# Patient Record
Sex: FEMALE | Race: WHITE | NOT HISPANIC OR LATINO | Employment: UNEMPLOYED | URBAN - METROPOLITAN AREA
[De-identification: names, ages, dates, MRNs, and addresses within clinical notes are randomized per-mention and may not be internally consistent; named-entity substitution may affect disease eponyms.]

---

## 2021-12-01 ENCOUNTER — HOSPITAL ENCOUNTER (OUTPATIENT)
Facility: CLINIC | Age: 51
Setting detail: OBSERVATION
Discharge: HOME | End: 2021-12-02
Attending: EMERGENCY MEDICINE | Admitting: EMERGENCY MEDICINE
Payer: COMMERCIAL

## 2021-12-01 DIAGNOSIS — F41.1 GAD (GENERALIZED ANXIETY DISORDER): ICD-10-CM

## 2021-12-01 DIAGNOSIS — F32.A DEPRESSION, UNSPECIFIED DEPRESSION TYPE: ICD-10-CM

## 2021-12-01 DIAGNOSIS — F41.0 PANIC DISORDER WITHOUT AGORAPHOBIA: ICD-10-CM

## 2021-12-01 DIAGNOSIS — F13.20 SEDATIVE, HYPNOTIC OR ANXIOLYTIC DEPENDENCE (H): ICD-10-CM

## 2021-12-01 DIAGNOSIS — I10 BENIGN ESSENTIAL HYPERTENSION: ICD-10-CM

## 2021-12-01 LAB
SARS-COV-2 RNA RESP QL NAA+PROBE: NEGATIVE
TROPONIN I SERPL HS-MCNC: 5 NG/L

## 2021-12-01 PROCEDURE — 250N000013 HC RX MED GY IP 250 OP 250 PS 637: Performed by: NURSE PRACTITIONER

## 2021-12-01 PROCEDURE — G0378 HOSPITAL OBSERVATION PER HR: HCPCS

## 2021-12-01 PROCEDURE — 93005 ELECTROCARDIOGRAM TRACING: CPT

## 2021-12-01 PROCEDURE — 90791 PSYCH DIAGNOSTIC EVALUATION: CPT

## 2021-12-01 PROCEDURE — 99285 EMERGENCY DEPT VISIT HI MDM: CPT | Mod: 25

## 2021-12-01 PROCEDURE — 84484 ASSAY OF TROPONIN QUANT: CPT | Performed by: EMERGENCY MEDICINE

## 2021-12-01 PROCEDURE — 87635 SARS-COV-2 COVID-19 AMP PRB: CPT | Performed by: EMERGENCY MEDICINE

## 2021-12-01 PROCEDURE — C9803 HOPD COVID-19 SPEC COLLECT: HCPCS

## 2021-12-01 PROCEDURE — 250N000013 HC RX MED GY IP 250 OP 250 PS 637: Performed by: EMERGENCY MEDICINE

## 2021-12-01 PROCEDURE — 36415 COLL VENOUS BLD VENIPUNCTURE: CPT | Performed by: EMERGENCY MEDICINE

## 2021-12-01 PROCEDURE — 99220 PR INITIAL OBSERVATION CARE,LEVEL III: CPT | Performed by: NURSE PRACTITIONER

## 2021-12-01 RX ORDER — CLONIDINE HYDROCHLORIDE 0.2 MG/1
0.2 TABLET ORAL 2 TIMES DAILY
COMMUNITY
End: 2021-12-02

## 2021-12-01 RX ORDER — NICOTINE 21 MG/24HR
1 PATCH, TRANSDERMAL 24 HOURS TRANSDERMAL EVERY EVENING
Status: DISCONTINUED | OUTPATIENT
Start: 2021-12-01 | End: 2021-12-02 | Stop reason: DRUGHIGH

## 2021-12-01 RX ORDER — HYDROXYZINE HYDROCHLORIDE 25 MG/1
25 TABLET, FILM COATED ORAL 3 TIMES DAILY PRN
COMMUNITY
End: 2021-12-01

## 2021-12-01 RX ORDER — LORAZEPAM 1 MG/1
1 TABLET ORAL ONCE
Status: COMPLETED | OUTPATIENT
Start: 2021-12-01 | End: 2021-12-01

## 2021-12-01 RX ORDER — GABAPENTIN 100 MG/1
200 CAPSULE ORAL 3 TIMES DAILY
Status: DISCONTINUED | OUTPATIENT
Start: 2021-12-01 | End: 2021-12-02 | Stop reason: HOSPADM

## 2021-12-01 RX ORDER — FLUOXETINE 10 MG/1
5 CAPSULE ORAL DAILY
COMMUNITY
End: 2021-12-01

## 2021-12-01 RX ORDER — CLONIDINE HYDROCHLORIDE 0.1 MG/1
0.2 TABLET ORAL 2 TIMES DAILY
Status: DISCONTINUED | OUTPATIENT
Start: 2021-12-01 | End: 2021-12-02 | Stop reason: HOSPADM

## 2021-12-01 RX ORDER — QUETIAPINE FUMARATE 50 MG/1
50 TABLET, FILM COATED ORAL AT BEDTIME
Status: DISCONTINUED | OUTPATIENT
Start: 2021-12-01 | End: 2021-12-02 | Stop reason: HOSPADM

## 2021-12-01 RX ORDER — LORAZEPAM 1 MG/1
1-2 TABLET ORAL 2 TIMES DAILY PRN
COMMUNITY
End: 2021-12-02

## 2021-12-01 RX ORDER — ESCITALOPRAM OXALATE 5 MG/1
5 TABLET ORAL DAILY
Status: DISCONTINUED | OUTPATIENT
Start: 2021-12-02 | End: 2021-12-02 | Stop reason: HOSPADM

## 2021-12-01 RX ADMIN — LORAZEPAM 1 MG: 1 TABLET ORAL at 13:55

## 2021-12-01 RX ADMIN — LORAZEPAM 1 MG: 1 TABLET ORAL at 16:07

## 2021-12-01 RX ADMIN — CLONIDINE HYDROCHLORIDE 0.1 MG: 0.1 TABLET ORAL at 20:06

## 2021-12-01 RX ADMIN — NICOTINE 1 PATCH: 14 PATCH, EXTENDED RELEASE TRANSDERMAL at 20:07

## 2021-12-01 RX ADMIN — GABAPENTIN 200 MG: 100 CAPSULE ORAL at 20:06

## 2021-12-01 ASSESSMENT — ENCOUNTER SYMPTOMS
NAUSEA: 0
NERVOUS/ANXIOUS: 1
SHORTNESS OF BREATH: 1
DIZZINESS: 1
VOMITING: 0

## 2021-12-01 ASSESSMENT — ACTIVITIES OF DAILY LIVING (ADL)
ORAL_HYGIENE: INDEPENDENT
DRESS: INDEPENDENT

## 2021-12-01 ASSESSMENT — MIFFLIN-ST. JEOR
SCORE: 1355.71
SCORE: 1343.68

## 2021-12-01 NOTE — ED TRIAGE NOTES
Pt having anxiety, feels like her blood pressure is high, hasn't had any ativan for 2 days and her provider won't give her a refill

## 2021-12-01 NOTE — ED PROVIDER NOTES
History   Chief Complaint:  Chest Pain, Hypertension, Medication Problem, and Anxiety       The history is provided by the patient.      Josephine Georges is a 51 year old female with history of anxiety, bell palsy, and alcoholism who presents with multiple complaints. The patient is currently visiting [place] and reports she has been struggling with increased stress and anxiety recently. The patient was started on lorazepam on 11/23 for her increased anxiety but was told to follow up with a mental health professional. She was unable to get around to scheduling an appointment due to problems with her insurance and not feeling well. She additionally reports about 1 month ago she was started on clonidine and hydroxyzine. Since then she has been experiencing chest heaviness and reports taking the lorazepam has helped decrease this heaviness. Since being in [place] her anxiety and depression have increased even more and she reports associated shortness of breath and dizziness when feeling anxious. Two days ago when at her hotel she called EMS due to these symptoms but reports they passed after taking her last lorazepam. Upon arrival to the ED today she denies any nausea, vomiting, leg swelling, or suicidal thoughts. She additionally denies history of heart disease or blood clots. The patient is not vaccinated and denies any recent exposures.     Review of Systems   Respiratory: Positive for shortness of breath.    Cardiovascular: Positive for chest pain (heaviness). Negative for leg swelling.   Gastrointestinal: Negative for nausea and vomiting.   Neurological: Positive for dizziness.   Psychiatric/Behavioral: Negative for suicidal ideas. The patient is nervous/anxious.    All other systems reviewed and are negative.    Allergies:  Penicillin V  Tetracycline  Doxycycline    Medications:  Quetiapine  Lorazepam  Buspirone  Propanolol   Hydroxyzine  clonidine     Past Medical History:     Anxiety  Panic attack  Bell  "palsy  Alcoholic     Family History:    Diabetes Type 2  Hypertension  Substance Abuse  Anxiety Disorder  Cerebrovascular Accident  Heart Disease  Crohn Disease    Social History:  Presents with friend.   PCP: Abraham Pyle (Inactive)   Tobacco Usage.     Physical Exam     Patient Vitals for the past 24 hrs:   BP Temp Pulse Resp SpO2 Height Weight   12/01/21 1550 133/83 -- 82 -- 98 % -- --   12/01/21 1400 (!) 167/106 -- 82 -- -- -- --   12/01/21 1340 -- -- -- -- 100 % -- --   12/01/21 1330 (!) 178/108 -- -- -- 99 % -- --   12/01/21 1237 (!) 182/94 98.2  F (36.8  C) 105 20 99 % 1.651 m (5' 5\") 73.5 kg (162 lb)       Physical Exam  SKIN:  Warm, dry.  HEMATOLOGIC/IMMUNOLOGIC/LYMPHATIC:  No pallor. No limb edema.  HENT: No jugular venous distention.  EYES:  Conjunctivae normal.  CARDIOVASCULAR:  Regular rate and rhythm.  No murmur. No rub  RESPIRATORY:  No respiratory distress, breath sounds equal and normal.  GASTROINTESTINAL:  Soft, nontender abdomen.  MUSCULOSKELETAL: Normal body habitus.  NEUROLOGIC:  Alert, conversant. Oriented to self place and time. No aphasia or dysarthria. No gross motor deficit. No gross tactile sensory deficit.  PSYCHIATRIC: Anxious mood with normal affect. Normal eye contact. No psychotic features.    Emergency Department Course   ECG  ECG obtained at 1239, ECG read at 1253  Normal sinus rhythm. Possible Left atrial enlargement. Borderline ECG.   No prior ECG to compare.   Rate 91 bpm. CT interval 158 ms. QRS duration 80 ms. QT/QTc 356/437 ms. P-R-T axes 61 58 45.     Laboratory:  Labs Ordered and Resulted from Time of ED Arrival to Time of ED Departure   TROPONIN I - Normal       Result Value    Troponin I High Sensitivity 5     COVID-19 VIRUS (CORONAVIRUS) BY PCR - Normal    SARS CoV2 PCR Negative         Emergency Department Course:    Reviewed:  I reviewed nursing notes, vitals, past medical history and Care Everywhere    Assessments:  1324 I obtained history and examined the patient " as noted above.   1430 I rechecked the patient and explained findings.     Interventions:  1355 Lorazepam, 1 mg, PO  1607 Lorazepam, 1 mg, PO    Disposition:  The patient was transferred to [facility].     Impression & Plan     Medical Decision Making:  Patient presents with complaint of a number of symptoms which I suspect are secondary to her generalized anxiety disorder for which treatment has not been yet optimized. Cardiac testing performed as above which was reassuring. Covid test negative. I consider the patient a good [facility] She agreed. She was transferred there for further care. She did improve a good amount with administration of oral Ativan.      Diagnosis:    ICD-10-CM    1. NANCY (generalized anxiety disorder)  F41.1    2. Benign essential hypertension  I10        Discharge Medications:  New Prescriptions    No medications on file       Scribe Disclosure:  I, Mariela Sharif, am serving as a scribe at 1:21 PM on 12/1/2021 to document services personally performed by Yanick Rosenberg MD based on my observations and the provider's statements to me.            Yanick Rosenberg MD  12/01/21 3021

## 2021-12-01 NOTE — ED NOTES
"Pt repeatedly asking for a prescription for ativan so that she can \"make it to her doctors appointment on Monday.\" Pt educated that we don't give prescriptions for ativan due to the potential for abuse and dependency, and that she should not drive or drink alcohol on it, why she stopped taking her clonazepam. Pt open to going to EMPATH unit, but refuses to get swabbed for COVID, because she is uncomfortable having something stuck in her nose. Pt is not vaccinated for covid because she is scared of the vaccine. Pts PCP refused to refill ativan prescription over the phone yesterday.   "

## 2021-12-01 NOTE — ED NOTES
"Pt requesting additional 1mg of ativan. Pt reports HA and heaviness in the chest and states \"If I am going over to empath and have to talk to a bunch of people I really dont want to feel like shit during it. Elise felt like shit for days.\" Pt offered tylenol for her HA and she states \"I dont do well with that kind of stuff\"  MD updated, Dr. Rosenberg states pt can have another dose of ativan. Plan for empath  "

## 2021-12-02 ENCOUNTER — TELEPHONE (OUTPATIENT)
Dept: EMERGENCY MEDICINE | Facility: CLINIC | Age: 51
End: 2021-12-02
Payer: COMMERCIAL

## 2021-12-02 VITALS
RESPIRATION RATE: 16 BRPM | TEMPERATURE: 98.2 F | DIASTOLIC BLOOD PRESSURE: 84 MMHG | WEIGHT: 157.6 LBS | BODY MASS INDEX: 25.33 KG/M2 | HEART RATE: 80 BPM | OXYGEN SATURATION: 98 % | HEIGHT: 66 IN | SYSTOLIC BLOOD PRESSURE: 148 MMHG

## 2021-12-02 LAB
ATRIAL RATE - MUSE: 91 BPM
DIASTOLIC BLOOD PRESSURE - MUSE: NORMAL MMHG
INTERPRETATION ECG - MUSE: NORMAL
P AXIS - MUSE: 61 DEGREES
PR INTERVAL - MUSE: 158 MS
QRS DURATION - MUSE: 80 MS
QT - MUSE: 356 MS
QTC - MUSE: 437 MS
R AXIS - MUSE: 58 DEGREES
SYSTOLIC BLOOD PRESSURE - MUSE: NORMAL MMHG
T AXIS - MUSE: 45 DEGREES
VENTRICULAR RATE- MUSE: 91 BPM

## 2021-12-02 PROCEDURE — 250N000013 HC RX MED GY IP 250 OP 250 PS 637: Performed by: PSYCHIATRY & NEUROLOGY

## 2021-12-02 PROCEDURE — G0378 HOSPITAL OBSERVATION PER HR: HCPCS

## 2021-12-02 PROCEDURE — 250N000013 HC RX MED GY IP 250 OP 250 PS 637: Performed by: NURSE PRACTITIONER

## 2021-12-02 PROCEDURE — 99217 PR OBSERVATION CARE DISCHARGE: CPT | Performed by: PSYCHIATRY & NEUROLOGY

## 2021-12-02 RX ORDER — HYDROXYZINE HYDROCHLORIDE 25 MG/1
25 TABLET, FILM COATED ORAL 3 TIMES DAILY PRN
Qty: 20 TABLET | Refills: 0 | Status: SHIPPED | OUTPATIENT
Start: 2021-12-02

## 2021-12-02 RX ORDER — CLONIDINE HYDROCHLORIDE 0.1 MG/1
0.1 TABLET ORAL DAILY
Qty: 10 TABLET | Refills: 0 | Status: SHIPPED | OUTPATIENT
Start: 2021-12-02

## 2021-12-02 RX ORDER — HYDROXYZINE HYDROCHLORIDE 25 MG/1
25 TABLET, FILM COATED ORAL
COMMUNITY
Start: 2021-11-01 | End: 2021-12-02

## 2021-12-02 RX ORDER — CALCIUM CARBONATE 500 MG/1
500 TABLET, CHEWABLE ORAL 2 TIMES DAILY PRN
Status: DISCONTINUED | OUTPATIENT
Start: 2021-12-02 | End: 2021-12-02 | Stop reason: HOSPADM

## 2021-12-02 RX ORDER — ESCITALOPRAM OXALATE 5 MG/1
5 TABLET ORAL DAILY
COMMUNITY
Start: 2021-11-10 | End: 2021-12-02

## 2021-12-02 RX ORDER — NICOTINE 21 MG/24HR
1 PATCH, TRANSDERMAL 24 HOURS TRANSDERMAL EVERY 24 HOURS
Qty: 14 PATCH | Refills: 0 | Status: SHIPPED | OUTPATIENT
Start: 2021-12-02 | End: 2021-12-16

## 2021-12-02 RX ORDER — NICOTINE 21 MG/24HR
1 PATCH, TRANSDERMAL 24 HOURS TRANSDERMAL DAILY
Status: DISCONTINUED | OUTPATIENT
Start: 2021-12-02 | End: 2021-12-02 | Stop reason: HOSPADM

## 2021-12-02 RX ORDER — CLONIDINE HYDROCHLORIDE 0.2 MG/1
0.2 TABLET ORAL AT BEDTIME
Refills: 0 | COMMUNITY
Start: 2021-12-02

## 2021-12-02 RX ORDER — ESCITALOPRAM OXALATE 5 MG/1
5 TABLET ORAL DAILY
Qty: 10 TABLET | Refills: 0 | Status: SHIPPED | OUTPATIENT
Start: 2021-12-02

## 2021-12-02 RX ORDER — GABAPENTIN 100 MG/1
200 CAPSULE ORAL 3 TIMES DAILY
Qty: 60 CAPSULE | Refills: 0 | Status: SHIPPED | OUTPATIENT
Start: 2021-12-02 | End: 2021-12-12

## 2021-12-02 RX ADMIN — ESCITALOPRAM 5 MG: 5 TABLET, FILM COATED ORAL at 10:27

## 2021-12-02 RX ADMIN — GABAPENTIN 200 MG: 100 CAPSULE ORAL at 07:04

## 2021-12-02 RX ADMIN — CLONIDINE HYDROCHLORIDE 0.2 MG: 0.1 TABLET ORAL at 08:23

## 2021-12-02 RX ADMIN — CALCIUM CARBONATE (ANTACID) CHEW TAB 500 MG 500 MG: 500 CHEW TAB at 07:04

## 2021-12-02 RX ADMIN — NICOTINE 1 PATCH: 21 PATCH, EXTENDED RELEASE TRANSDERMAL at 07:03

## 2021-12-02 ASSESSMENT — ACTIVITIES OF DAILY LIVING (ADL): DRESS: SCRUBS (BEHAVIORAL HEALTH)

## 2021-12-02 NOTE — ED NOTES
[facility] Reassessment and Progress Note    Client Name: Josephine Georges  Date: December 2, 2021       Presenting issue that brought patient to the emPATH unit: Pt presents to EmPATH due to increased anxiety and somatic concerns related to anxiety.    Current presentation on the unit: Pt is anxious, but cooperative.  She has many questions about her medication and her blood pressure.  She tells writer she feels dizzy and thinks it is due to a medication she took about an hour ago.  Writer encouraged pt to eat some breakfast as it may help with her feeling dizzy and indicated writer would update RN and MD regarding her medication concerns.    Current risk to self or others? No    Summary of therapeutic interventions completed with patient: Pt was not interested in therapeutic interventions at this time and preferred medication intervention and was focused on her physical needs.    Treatment objectives addressed in this session: Pt was not interested in therapeutic interventions at this time.    Progress on treatment goals: Pt only expressed interest in her medication to manage her anxiety.    Additional collateral information: N/A     Mental Status:     Appearance:   Appropriate    Eye Contact:   Fair    Psychomotor Behavior: Normal    Attitude:   Friendly   Orientation:   All   Speech    Rate / Production: Normal/ Responsive    Volume:  Normal    Mood:    Anxious    Affect:    Expansive  Worrisome    Thought Content:  Perservative    Thought Form:  Coherent    Insight:    Poor       Plan: Pt will discharge home with a follow-up scheduled with her primary care doctor where she lives ([place] not live in the [place]).  Writer offered to schedule a virtual visit with a therapist, however, pt declined stating she can connect with someone in her area she has seen in the past.  Pt also indicated she no longer wants to meet with primary care and that she will be scheduling with a new  provider.    Disposition: Individual therapy  and Medication management    Rationale for disposition: Pt denies any acute safety concerns.  Pt declined all therapeutic interventions other than medication management.  Pt was evaluated by psychiatry who recommended discharge.    Reviewed assessment with attending provider: Yes     Total time spent with patient:.25 hrs     CPT code: 91108 - Psychotherapy (with patient) - 30 (16-37*) min     If I am feeling unsafe or I am in a crisis, I will:   Contact my established care providers   Call the National Suicide Prevention Lifeline: 999.316.5613   Go to the nearest emergency room   Call 911      Warning signs that I or other people might notice when a crisis is developing for me: 1. Feeling panic  2. Blood Pressure is High  3. Changes Happening In My Body     Things I am able to do on my own to cope or help me feel better:   1. Breathing  2. Watching My Eating To Address My Blood Pressure      Things that I am able to do with others to cope or help me better:   1. Talk To My Friend Zeeshan for support and distraction  2. Spend time with Youngest Daughter and calling other adult children.       Things I can use or do for distraction:   1.Breathing      Changes I can make to support my mental health and wellness:   1. Continue to Watching Diet  2. Be Referred to Medication Management and Therapy appointments to follow up with mental heatlh.       People in my life that I can ask for help:   1. My adult children  2. My friend Zeeshan.       Your [facility] has a mental health crisis team you can call 24/7: [facility] Crisis: [phone]        ERNESTO Ramon

## 2021-12-02 NOTE — TREATMENT PLAN
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     Anticipated number of sessions or this episode of care: 1-4    MeasurableTreatment Goal(s) related to diagnosis / functional impairment(s)    Goal: Patient will increase awareness of anxiety and their impact on functioning and develop skills to reduce negative impact.      Objective #A       Patient will describe thoughts, feelings, and actions associated with anxiety.       Intervention(s)      LMHP will explore and process with patient how anxiety has impacted them.      Objective #B      Patient will increase distress tolerance coping skills.      Intervention(s)      LMHP will teach DBT skills and model their use.             Goal: Patient will identify anxiety triggers and maladaptive responses to them.      Objective #A       Patient will identify situations, thoughts, and behaviors that trigger anxiety.      Intervention(s)      LMHP will facilitate guided discussion.      Objective #B      Patient will identify maladaptive responses to anxiety and develop at least 2 alternative strategies for coping.      Intervention(s)      LMHP will provide psychoeducation and modeling. December 1, 20221                Appearance:   Appropriate    Eye Contact:   Fair    Psychomotor Behavior: Restless    Attitude:   Cooperative    Orientation:   All   Speech    Rate / Production: Normal     Volume:  Normal    Mood:    Anxious  Depressed    Affect:    Appropriate    Thought Content:  Clear    Thought Form:  Coherent  Obsessive    Insight:    Fair           PLAN:   Patient will board in emPATH until patient and treatment deem it appropriate to either discharge or  admit to a higher level of care. Details:       Reno Nolasco, LICSW                                                           ________

## 2021-12-02 NOTE — DISCHARGE INSTRUCTIONS
Follow-up with your primary care, Dr. Brown  Appt on 12/6/2021 @ 1:30 pm    Some therapists in your area:  [facility]  [address]  [place] [address]  Phone: [phone]    [facility]  [address]  [place] [address]  Phone: [phone]    If I am feeling unsafe or I am in a crisis, I will:   Contact my established care providers   Call the National Suicide Prevention Lifeline: 788.864.2511   Go to the nearest emergency room   Call 911      Warning signs that I or other people might notice when a crisis is developing for me: 1. Feeling panic  2. Blood Pressure is High  3. Changes Happening In My Body     Things I am able to do on my own to cope or help me feel better:   1. Breathing  2. Watching My Eating To Address My Blood Pressure      Things that I am able to do with others to cope or help me better:   1. Talk To My Friend Zeeshan for support and distraction  2. Spend time with Youngest Daughter and calling other adult children.       Things I can use or do for distraction:   1.Breathing      Changes I can make to support my mental health and wellness:   1. Continue to Watching Diet  2. Be Referred to Medication Management and Therapy appointments to follow up with mental heatlh.       People in my life that I can ask for help:   1. My adult children  2. My friend Zeeshan.       Your [facility] has a mental health crisis team you can call 24/7: [facility] Crisis: [phone]      Crisis Lines  Crisis Text Line  Text 927912  You will be connected with a trained live crisis counselor to provide support.    Gambling Hotline  1.800333.hope [4673]    National Hope Line  1.800.SUICIDE [8032092]    National Suicide Prevention Lifeline  Free and confidential support  1.873.975.TALK [7043]  http://suicidepreventionlifeline.org    [employer] (LGBTQ Youth Crisis Line)  1.454.977.5405  text START to 173-126    [place] Crisis Line  5.695.929.8258 (Press 1)  or  "text 461243    Community Resources  Fast Tracker  Linking people to mental health and substance use disorder resources  [employer] System[employer].org     [place] Mental Health Warm Line  Peer to peer support  Monday thru Saturday, 12 pm to 10 pm  533.883.1441 or 4.086.914.5775  Text \"Support\" to 11481    National [employer] Mental Illness (VERNON)  474.276.5761 or 1.888.VERNON.HELPS    Walk-in Counseling Center  Free mental health counseling  [address] [facility]  887.254.6748    Mental Health Apps  My3  https://BuzzElementpp.org/    VirtualHopeBox  https://Oohly/apps/virtual-hope-box/    Suicide Safety Plan ([employer])    Calm Harm    "

## 2021-12-02 NOTE — ED NOTES
Pt continues to perseverate on medication s/e and reports that she feels dizzy and believes this is because she took .2mg of clonidine vs. .1mg which she reports is what she should be tapered to. Pt reports that she is worried that she may need a lower dose of ativan to help with anxiety as she was using a higher dose before and cannot get it refilled at this time.  BP was WNL.

## 2021-12-02 NOTE — ED NOTES
"Pt reported that she was feeling \"happy\" and better than she had felt in over a month. Pt had many concerns about taking medications and was fixated on side effects of each medication and which medications may have interactions. Pt was also worried that her blood pressure was high. VSS at this time. Pt was provided education about s/e of medications.   "

## 2021-12-02 NOTE — ED NOTES
Pt requested to have her blood pressure checked prior to leaving and became concerned when she had a slightly elevated systolic number. Pt was validated regarding her concerns yet was informed that this staff would not retake her blood pressure as it appears to be increasing her anxiety and fixation on somatic complaints. VSS

## 2021-12-02 NOTE — TELEPHONE ENCOUNTER
[facility] Emergency Department Lab result notification     Patient/parent Name  Josephine    Reason for call  Patient requesting lab result    Lab Result  ECG results  Recommendations/Instructions  Patient is concerned that at her ER visit 12/1/21 (that she was just discharged from today 12/2/21) she had an EKG and the results or interpretation was not relayed to her. She would like the provider who had seen her relay these results to her.   Did advise contacting ER nursing staff as she was just discharged and patient was agreeable. Direct transferred to [facility] ER        Roxy Barajas, MARV  [facility] [employer] [place]  Emergency Dept Lab Result RN  Ph# [phone]

## 2021-12-02 NOTE — ED PROVIDER NOTES
"EmPATH Unit - Psychiatric Observation Discharge Summary  [facility] Emergency Department  Discharge Date: 12/2/2021    Josephine Georges MRN: [id number]   Age: 51 year old YOB: 1970     Brief HPI & Initial ED Course     Chief Complaint   Patient presents with     Chest Pain     Hypertension     Medication Problem     Anxiety     HPI  Josephine Georges is a 51 year old female with history notable for anxiety and depression who presented to the emergency room reporting heightened anxiety in the context of somatic concerns involving dizziness and concern for elevated blood pressure and heart rate.  She was transferred to the empath unit for psychiatric assessment where she was evaluated by AMINATA Alcantar who initiated Lexapro, hydroxyzine, and gabapentin for mood and anxiety management.  She is being reassessed today.  Nursing staff notes that the patient is frequently concerned about vital signs stability.  On examination, the patient reviewed with me the amount of concern she has had about her blood pressure over the past 1 month.  She describes it ranging up to a systolic of 170, leading to frequent reassurance from medical providers, home blood pressure measuring devices, and/or ED visits.  She recently finished a supply of lorazepam and noted elevated blood pressure and panic-like symptoms which led to her arrival.  Today, she feels much better and is tolerating her medications adequately.  She feels reassured that her blood pressure has been normal today.  Anxiety is low today.  Mood is euthymic.  We discussed plans to transition out of the hospital today.  There was no indication of suicidal or homicidal thoughts.  There was no indication of psychosis.        Physical Examination   BP: 120/82  Pulse: 69  Temp: 98.2  F (36.8  C)  Resp: 16  Height: 166.4 cm (5' 5.5\")  Weight: 71.5 kg (157 lb 9.6 oz)  SpO2: 96 %    Physical Exam  General: Appears stated age.   Neuro: Alert and fully oriented. Extremities " appear to demonstrate normal strength on visual inspection.   Integumentary/Skin: no rash visualized, normal color    Psychiatric Examination   Appearance: awake, alert  Attitude:  cooperative  Eye Contact:  fair  Mood:  anxious and better  Affect:  appropriate and in normal range  Speech:  clear, coherent  Psychomotor Behavior:  no evidence of tardive dyskinesia, dystonia, or tics  Thought Process:  linear  Associations:  no loose associations  Thought Content:  no evidence of suicidal ideation or homicidal ideation and no evidence of psychotic thought  Insight:  fair  Judgement:  intact  Oriented to:  time, person, and place  Attention Span and Concentration:  intact  Recent and Remote Memory:  intact  Language: able to name/identify objects without impairment  Fund of Knowledge: intact with awareness of current and past events    Results        Labs Ordered and Resulted from Time of ED Arrival to Time of ED Departure   TROPONIN I - Normal       Result Value    Troponin I High Sensitivity 5     COVID-19 VIRUS (CORONAVIRUS) BY PCR - Normal    SARS CoV2 PCR Negative         Observation Course   The patient was found to have a psychiatric condition that would benefit from an observation stay in the emergency department for further psychiatric stabilization and/or coordination of a safe disposition. The plan upon observation admission included serial assessments of psychiatric condition, potential administration of medications if indicated, further disposition pending the patient's psychiatric course during the monitoring period.     Serial assessments of the patient's psychiatric condition were performed. Nursing notes were reviewed. During the observation period, the patient did not require medications for agitation, and did not require restraints/seclusion for patient and/or provider safety.     After a period of working with the treatment team on the EmPATH unit, the patient's mental state improved to allow a safe  transition to outpatient care. After counseling on the diagnosis, work-up, and treatment plan, the patient was discharged. Close follow-up with a psychiatrist and/or therapist was recommended and community psychiatric resources were provided. Patient is to return to the ED if any urgent or potentially life-threatening concerns.     Discharge Diagnoses:   Final diagnoses:   NANCY (generalized anxiety disorder)   Benign essential hypertension   Depression, unspecified depression type   Panic disorder without agoraphobia   Sedative, hypnotic or anxiolytic dependence (H)       Treatment Plan:  -Continue gabapentin for anxiety reduction.  Secondary benefits may also include reduction of any potential benzodiazepine withdrawal symptoms related to her recent usage of the lorazepam.  -Continue Lexapro 5 mg daily for mood and anxiety management  -Continue Vistaril 25 mg 3 times a day as needed for anxiety  -She informed me that she has been taking a slightly lower dose of clonidine than what was last prescribed.  She will continue to take 0.1 mg daily and 0.2 mg at bedtime.  We discussed risks of abrupt discontinuation.  -Resume outpatient medication management with her primary care physician later next week  -Encourage a referral for individual psychotherapy  -Discharge home today.      At the time of discharge, the patient's acute suicide risk was determined to be low due to the following factors: Reduction in the intensity of mood/anxiety symptoms that preceded the admission, denial of suicidal thoughts, denies feeling helpless or helpless, not currently under the influence of alcohol or illicit substances, denies experiencing command hallucinations, no immediate access to firearms. The patient's acute risk could be higher if noncompliant with their treatment plan, medications, follow-up appointments or using illicit substances or alcohol. Protective factors include: social supports, stable housing    --  Tomy Douglas,  MD [facility] EMERGENCY DEPT  EmPATH Unit  12/2/2021      Tomy Douglas MD  12/02/21 1024

## 2021-12-02 NOTE — ED PROVIDER NOTES
[facility] Unit - Initial Psychiatric Observation Note  [facility] Emergency Department  Observation Initiation Date: Dec 1, 2021    Josephine Georges MRN: [id number]   Age: 51 year old YOB: 1970     History     Chief Complaint   Patient presents with     Chest Pain     Hypertension     Medication Problem     Anxiety     HPI  Josephine Georges is a 51 year old female with a past history notable for anxiety with panic disorder, bell palsy, depression and benzodiazapine dependence who presents to the ED for increased anxiety.  She was medically cleared in the ED and transferred to [facility] for psychiatric assessment.  On examination, patient reports she is from [place], [place] in the area since last week as she dropped her daughter off at the airport and is waiting to pick her up on Friday. She states she called EMS Monday night due to a panic attack.  She notes she had a couple pills of Ativan 1 mg so she took those prior to their arrival and was feeling better.  She notes today she experienced another panic attack where she felt chest heaviness, dizziness, and difficulty breathing but did not have any more ativan to take thus presented to the ED.  She was noted to be hypertensive in the ED and had a cardiac workup, Patient reports her hypertensive state is related to debilitating anxiety and when her blood pressure is high she feels considerable body pain.  She notes ativan is the only medication that reduces her blood pressure.  She notes a 8-9 year history of benzodiazapine use for anxiety after experiencing bell palsy.  She states she was stable on clonazepam 0.5 mg three times a day for years than this fall she states her primary provider was no longer authorized to prescribe it to her due to having alcohol in her blood during an office visit check.  She states she had drank on a [place] vacation days prior and that is when the alcohol got into her system. She denies alcohol use.  Review of the PDMP  "verifies her last prescription for Clonazepam was filled on 07/27/2021.  She has been unable to get an appointment with a prescriber to resume her prescription and apparently has been seeking treatment for anxiety and hypertension in the ED and has been given two prescriptions for ativan to cover her until her appointment on 12/6/21.  On 11/18, she received a 10 day supply for Ativan 1 mg twice a day and then on 11/23 she received a 5 day supply for ativan 1 mg twice a day, which she used last of two days ago.  She admits she was taking more than two tablets a day as her anxiety was that persistent, stating she was using 3 mg daily.  She was recommended to take Prozac 5 mg daily but has not started this as she is concerned how it would affect her blood pressure.  She is seeking assistance in managing her anxiety and depressive symptoms. She states she can't sleep, worries all the times and feels anxiety and depression all day everyday.    Past Medical History  No past medical history on file.  No past surgical history on file.  cloNIDine (CATAPRES) 0.2 MG tablet  LORazepam (ATIVAN) 1 MG tablet      Allergies   Allergen Reactions     Penicillin V      Tetracycline      Family History  No family history on file.  Social History   Social History     Tobacco Use     Smoking status: Not on file     Smokeless tobacco: Not on file   Substance Use Topics     Alcohol use: Not on file     Drug use: Not on file      Past medical history, past surgical history, medications, allergies, family history, and social history were reviewed with the patient. No additional pertinent items.       Review of Systems  A complete review of systems was performed with pertinent positives and negatives noted in the HPI, and all other systems negative.    Physical Examination   BP: (!) 182/94  Pulse: 105  Temp: 98.2  F (36.8  C)  Resp: 20  Height: 165.1 cm (5' 5\")  Weight: 73.5 kg (162 lb)  SpO2: 99 %    Physical Exam  General: Appears stated " age.   Neuro: Alert and fully oriented. Extremities appear to demonstrate normal strength on visual inspection.   Integumentary/Skin: no rash visualized, normal color    Psychiatric Examination   Appearance: awake, alert  Attitude:  cooperative  Eye Contact:  good  Mood:  sad   Affect:  mood congruent  Speech:  clear, coherent  Psychomotor Behavior:  no evidence of tardive dyskinesia, dystonia, or tics  Thought Process:  logical, linear and goal oriented  Associations:  no loose associations  Thought Content:  no evidence of suicidal ideation or homicidal ideation and no evidence of psychotic thought  Insight:  good  Judgement:  intact  Oriented to:  time, person, and place  Attention Span and Concentration:  intact  Recent and Remote Memory:  intact  Language: able to name/identify objects without impairment  Fund of Knowledge: intact with awareness of current and past events    ED Course        Labs Ordered and Resulted from Time of ED Arrival to Time of ED Departure   TROPONIN I - Normal       Result Value    Troponin I High Sensitivity 5     COVID-19 VIRUS (CORONAVIRUS) BY PCR - Normal    SARS CoV2 PCR Negative         Assessments & Plan (with Medical Decision Making)   Patient presenting with anxiety, panic and depression . Nursing notes reviewed noting no acute issues.     I have reviewed the assessment completed by the [facility].     I have reviewed the PDMP database to review benzodiazapine prescriptions.    During the observation period, the patient did not require medications for agitation, and did not require restraints/seclusion for patient and/or provider safety.     The patient was found to have a psychiatric condition that would benefit from an observation stay in the emergency department for further psychiatric stabilization and/or coordination of a safe disposition. The observation plan includes serial assessments of psychiatric condition, potential administration of medications if indicated, further  disposition pending the patient's psychiatric course during the monitoring period.     Preliminary diagnosis:    ICD-10-CM    1. NANCY (generalized anxiety disorder)  F41.1    2. Benign essential hypertension  I10    3. Depression, unspecified depression type  F32.A    4. Panic disorder without agoraphobia  F41.0    5. Sedative, hypnotic or anxiolytic dependence (H)  F13.20         Treatment Plan:  -Discussed treatment options for managing her anxiety without utilizing Ativan.  She is ambivalent about alternative treatment options.  We discussed transfer to inpatient detox if she would like to explore those options, which she declined.  -Discussed the negative impact of long term benzo use along with contributing anxiety and depressive symptoms.   -Discussed the impact trauma has on one's emotional wellbeing and the benefit of trauma therapy as patient reports a history of childhood trauma and suffered the loss of a child.  -Educated patient on the physiological effect chronic anxiety has on ones central nervous system.  -Reassured patient her vitals were stable on arrival to [facility] and there would be no need for additional monitoring.  -Developed a medication plan with patient's input on dosing.  -Will discontinue prn atarax as she does not find it helpful and is concerned about drug interactions.  -Will start Gabapentin 200 mg three times a day for anxiety.  -Will discharge Prozac due to patients concerns of HTN and restart Lexapro 5 mg daily for depression and anxiety, as patient reports she had a positive result with the few doses she had in the past, but stopped due to dizziness.    -Seroquel 50 mg at bedtime for sleep.  -Continue home medication: Clonidine 0.2 mg twice a day.  -Observe for signs of benzo withdrawal although I anticipate with her being off Klonopin since end of August and only recently receiving ativan that risk should be low.  -Observation status for symptom monitoring and medication  management.  -Reassess tomorrow.  --  RACHEL Taylor CNP   [facility] EMERGENCY DEPT  [facility] Unit  12/1/2021        Katharine Carroll APRN CNP  12/01/21 2133       Katharine Carroll APRN CNP  12/01/21 2136

## 2021-12-02 NOTE — ED NOTES
Patient agrees to discharge plan. Discharge instructions reviewed with patient including follow-up care plan. Medication changes were reviewed and pt was adamant that she needed 30 day supply of all medications. Pt was informed that she needed to attend her f/u appt on 12/6 with her PCP. Pt stated that she would not be going to that appt as she did not like the provider. Pt was encouraged to set up another appt within the next 10 days so that she can continue to take medications as prescribed as the psychiatrist was not going to provide more than 10 days of medications.      Reviewed safety plan and outpatient resources. Denies SI and HI. All belongings that were brought into the hospital have been returned to patient. Escorted off the unit at 1215 accompanied by [facility] staff. Discharged to community via friend.

## 2021-12-02 NOTE — ED NOTES
Josephine is a 51 year old female with history of anxiety received from ED due to anxiety and high blood pressure. Reports Ativan helps her anxiety best. Two months ago her MD discontinued her Klonopin she had been taking for 9 years. Therefore, the past 2 months have not been easy. Patient has been relying on some PRN Ativan to get by until she finds an anxiety medication that works well for her. Patient denies SI/HI. Patient will be in town until Saturday so she can  her daughter at the airport.     Observation sheet given to patient and discussed with staff.    Nursing and risk assessments completed. Assessments reviewed with [facility] and physician. Video monitoring in progress, patient informed.  Admission information reviewed with patient. Patient given a tour of [facility] and instructions on using the facility. Questions regarding [facility] addressed. Pt search completed and belongings inventoried.

## 2021-12-02 NOTE — CONSULTS
12/1/2021  Josephine Georges 1970     [facility] Crisis Assessment    Patient was assessed: in person  Patient location: [facility]     Referral Data and Chief Complaint  Josephine is a 51 year old who uses she/her pronouns. Patient presented to the ED via EMS and was referred to the ED by self. Patient is presenting to the ED for the following concerns: anxiety and blood pressure issue. .      Informed Consent and Assessment Methods    Patient is her own guardian. Writer met with patient and explained the crisis assessment process, including applicable information disclosures and limits to confidentiality, assessed understanding of the process, and obtained consent to proceed with the assessment. Patient was observed to be able to participate in the assessment as evidenced by actively listening and enaged in assessment. Assessment methods included conducting a formal interview with patient, review of medical records, collaboration with medical staff, and obtaining relevant collateral information from family and community providers when available.    Narrative Summary of Presenting Problem and Current Functioning  What led to the patient presenting for crisis services, factors that make the crisis life threatening or complex, stressors, how is this disrupting the patient's life, and how current functioning is in comparison to baseline. How is patient presenting during the assessment.     Patient presents to the ED today from her hotel room due to panic symptoms and hyper tension.  The patient has been out of her benzodiazepine 2 days ago from getting a small Rx from a doctor.  She reports having problems with sleep last night.  She feels unable to cope and function.     History of the Crisis  Duration of the current crisis, coping skills attempted to reduce the crisis, community resources used, and past presentations.    Patient reports long standing hx of anxiety.  She had been on Klonopin 0.5 mg TID for 9 years  and her doctor discontinued due to some alcohol use, reports last dose of Klonopin she took was September 15.  She went to see a doctor other than her PCP to get several  Benzodiazepine RX from several doctors within the last month to help for her anxiety. Her doctor has her on Cloinidine and Hydroxizine for which she feels are not helpful in managing her anxiety. One doctor gave her a Rx for Prozac, but she has been researching this medication and doesn't feel comfortable taking it.  She has been on other medication trails including: Zoloft for 16 years, stopped in .  Lexapro in 2021, took for 4 days and reports having reaction.  She feel Ativan is the only medication to help with her symptoms.    Patient reports feeling more depressed lately.  Thanksgiving weekend is the 10 year anniversary of her daughter who  from a genetic disorder.  Patient admitted she started drinking heavily shortly there after.  She entered treatment at [facility] and also then started having a gambling issue.  She states currently does not drink alcohol and denies problems with gambling.     Patient has been staying here in the  since last Friday as her daughter flew out of [facility] airport to see a band and she arrives back this Friday.  Patient decided to stay the week in a hotel.  Patient currently does not work, previously worked as a Pharm Tech.      Collateral Information  Spoke to her friend Zeeshan ([phone]) who was asked to come to the  yesterday as the patient was out of medication,she reports feeling panicky (suspect was going through withdrawal).  He reports patient has no hx of SI/HI.  States she continuously researches health issues on the Internet and very in tuned to her body noticing any changes that might be present.      Risk Assessment    Risk of Harm to Self     ESS-6  1.a. Over the past 2 weeks, have you had thoughts of killing yourself? No  1.b. Have you ever attempted to kill yourself and, if yes,  when did this last happen? No   2. Recent or current suicide plan? No   3. Recent or current intent to act on ideation? No  4. Lifetime psychiatric hospitalization? No  5. Pattern of excessive substance use? Yes-Hx of alcohol and gambling use disorder   6. Current irritability, agitation, or aggression? No  Scoring note: BOTH 1a and 1b must be yes for it to score 1 point, if both are not yes it is zero. All others are 1 point per number. If all questions 1a/1b - 6 are no, risk is negligible. If one of 1a/1b is yes, then risk is mild. If either question 2 or 3, but not both, is yes, then risk is automatically moderate regardless of total score. If both 2 and 3 are yes, risk is automatically high regardless of total score.     Score: 1, mild risk    The patient has the following risk factors for suicide: depressive symptoms and family disruption    Is the patient experiencing current suicidal ideation: No    Is the patient engaging in preparatory suicide behaviors (formulating how to act on plan, giving away possessions, saying goodbye, displaying dramatic behavior changes, etc)? No    Does the patient have access to firearms or other lethal means? no    The patient has the following protective factors: social support, expresses desire to engage in treatment and safe/stable housing    Support system information: Her friend Zeeshan and her children (ages 30, 25, 26 and 15)    Patient strengths: Support from friend, states having close relationship with her PCP,     Does the patient engage in non-suicidal self-injurious behavior (NSSI/SIB)? no    Is the patient vulnerable to sexual exploitation?  No    Is the patient experiencing abuse or neglect? no    Is the patient a vulnerable adult? No      Risk of Harm to Others  The patient has the following risk factors of harm to others: no risk factors identified    Does the patient have thoughts of harming others? No    Is the patient engaging in sexually inappropriate behavior?   no       Current Substance Abuse    Is there recent substance abuse? no    Was a urine drug screen or blood alcohol level obtained: No    CAGE AID  Have you felt you ought to cut down on your drinking or drug use?  No  Have people annoyed you by criticizing your drinking or drug use? Yes  Have you felt bad or guilty about your drinking or drug use? Yes  Have you ever had a drink or used drugs first thing in the morning to steady your nerves or to get rid of a hangover? No  Score: 2/4       Current Symptoms/Concerns    Symptoms  Attention, hyperactivity, and impulsivity symptoms present: No    Anxiety symptoms present: Yes: Panic attacks and Generalized Symptoms: Avoidance, Cognitive anxiety - feelings of doom, racing thoughts, difficulty concentrating , Excessive worry, Pacing, Physiological anxiety - sweating, flushing, shaking, shortness of breath, or racing heart and Somatic symptoms - abdominal pain, headache, or tension      Appetite symptoms present: Yes: Loss of Appetite     Behavioral difficulties present: No     Cognitive impairment symptoms present: No    Depressive symptoms present: Yes Appetite change/weight change , Depressed mood, Loss of interest / Anhedonia  and Sleep disturbance      Eating disorder symptoms present: No    Learning disabilities, cognitive challenges, and/or developmental disorder symptoms present: No     Manic/hypomanic symptoms present: No    Personality and interpersonal functioning difficulties present : Yes: Emotional Deregulation    Psychosis symptoms present: No      Sleep difficulties present: Yes: Difficulty falling asleep     Substance abuse disorder symptoms present: No     Trauma and stressor related symptoms present: No           Mental Status Exam   Affect: Appropriate   Appearance: Appropriate    Attention Span/Concentration: Attentive?    Eye Contact: Engaged   Fund of Knowledge: Appropriate    Language /Speech Content: Fluent   Language /Speech Volume: Normal     Language /Speech Rate/Productions: Normal    Recent Memory: Intact   Remote Memory: Intact   Mood: Anxious and Depressed    Orientation to Person: Yes    Orientation to Place: Yes   Orientation to Time of Day: Yes    Orientation to Date: Yes    Situation (Do they understand why they are here?): Yes    Psychomotor Behavior: Normal    Thought Content: Clear   Thought Form: Obsessive/Perseverative       Mental Health and Substance Abuse History    History  Current and historical diagnoses or mental health concerns: Depression and Anxiety    Prior MH services (inpatient, programmatic care, outpatient, etc) : No inpatient or IOP treatment.  Has never been in therapy in the past.     History of substance abuse: Yes As noted earlier    Prior LEVI services (inpatient, programmatic care, detox, outpatient, etc) : Yes Has been [facility] 10 years ago    History of commitment: No    Family history of MH/LEVI: Yes 2 borthers with opiate use disorder     Trauma history: No    Medication  Psychotropic medications: Yes. Pt is currently taking As noted. Medication compliant: No: As noted above. . Recent medication changes: No    Current Care Team  Primary Care Provider: Yes. Name: Dr Disla. Location: [place] [address]. Date of last visit: 2 weeks. Frequency: unknown. Perceived helpfulness: Has a good relationship with this provider. .    Psychiatrist: No    Therapist: No    : No    CTSS or ARMHS: No    ACT Team: No    Other: No    Release of Information  Was a release of information signed: No. Reason: Patient states being too anxious to sign SANG presently.       Biopsychosocial Information    Socioeconomic Information  Current living situation: Lives with youngest daughter.    Employment/income source: Patient has not worked for past 3 years.     Relevant legal issues: Denies    Cultural, Restorationism, or spiritual influences on mental health care: None    Is the patient active in the   or a : No      Relevant Medical Concerns   Patient identifies concerns with completing ADLs? Yes     Patient can ambulate independently? Yes     Other medical concerns? Yes -Hypertension    History of concussion or TBI? No        Diagnosis    311 (F32.9) Unspecified Depressive Disorder     300.02 (F41.1) Generalized Anxiety Disorder    Substance-Related & Addictive Disorders 292.0 Sedative, Hypnotic or Anxiolytic Withdrawal  none  (F13.239) Sedative, Hypnotic or Anxiolytic Withdrawal Without perceptual disturbances - provisional      Therapeutic Intervention  The following therapeutic methodologies were employed when working with the patient: establishing rapport, active listening and identifying additional supports and alternative coping skills. Patient response to intervention: Patient is willing to stay for observation, wanting recommendation regarding medications.  Does want follow up with medication manager and therapist when discharging from [facility].      Disposition  Recommended disposition: Patient to be admitted to OBS status for further assessment and stabilization.     Reviewed case and recommendations with attending provider. Attending Name: RACHEL Carroll CNP      Attending concurs with disposition: Yes      Patient concurs with disposition: Yes      Guardian concurs with disposition: NA     Final disposition: Other: Patient being admitted to OBS status for further assessment and stabilization.  Patient to be referred to OP medication management and therapist once discharing from [facility].         Clinical Substantiation of Recommendations   Rationale with supporting factors for disposition and diagnosis.     The patient presents today with acute anxiety symptoms, physiological dependence on benzodiazepines and needing psychotherapy to address past issues of trauma and coping skills to manage her anxiety.  Patient will remain on OBS status regarding medication recommendations and for patient  will need OP therapy and medication management follow up once leaving [facility] to help with stabilization.        Assessment Details  Patient interview started at: 1730 and completed at: 1830.    Total duration spent on the patient case in minutes: 1.0 hrs     CPT code(s) utilized: 34820 - Psychotherapy for Crisis - 60 (30-74*) min       [facility]      Aftercare Plan  If I am feeling unsafe or I am in a crisis, I will:   Contact my established care providers   Call the National Suicide Prevention Lifeline: 378.283.5006   Go to the nearest emergency room   Call 911     Warning signs that I or other people might notice when a crisis is developing for me: 1. Feeling panic  2. Blood Pressure is High  3. Changes Happening In My Body    Things I am able to do on my own to cope or help me feel better:   1. Breathing  2. Watching My Eating To Address My Blood Pressure     Things that I am able to do with others to cope or help me better:   1. Talk To My Friend Zeeshan for support and distraction  2. Spend time with Youngest Daughter and calling other adult children.      Things I can use or do for distraction:   1.Breathing     Changes I can make to support my mental health and wellness:   1. Continue to Watching Diet  2. Be Referred to Medication Management and Therapy appointments to follow up with mental heatlh.      People in my life that I can ask for help:   1. My adult children  2. My friend Zeeshan.      Your [facility] has a mental health crisis team you can call 24/7: [facility]: 1-800.710.4863    Other things that are important when I m in crisis:      Additional resources and information:       Crisis Lines  Crisis Text Line  Text 162486  You will be connected with a trained live crisis counselor to provide support.    National Hope Line  1.800.SUICIDE [9229951]      Community Resources  Fast Tracker  Linking people to mental health and substance use disorder resources  ETARGET.org  "    [facility] Warm Line  Peer to peer support  Monday thru Saturday, 12 pm to 10 pm  641.759.6086 or 6.048.221.6381  Text \"Support\" to 92143    National [place] on Mental Illness (VERNON)  732.711.7381 or 1.888.VERNON.HELPS        Mental Health Apps  My3  [email or web address]://my[employer] [email or web address]/    VirtualHopeBox  [email or web address]/virtual-hope-box/          "

## 2021-12-04 ENCOUNTER — HOSPITAL ENCOUNTER (EMERGENCY)
Facility: CLINIC | Age: 51
Discharge: HOME | End: 2021-12-04
Attending: EMERGENCY MEDICINE | Admitting: EMERGENCY MEDICINE
Payer: COMMERCIAL

## 2021-12-04 ENCOUNTER — APPOINTMENT (OUTPATIENT)
Dept: GENERAL RADIOLOGY | Facility: CLINIC | Age: 51
End: 2021-12-04
Attending: EMERGENCY MEDICINE
Payer: COMMERCIAL

## 2021-12-04 VITALS
SYSTOLIC BLOOD PRESSURE: 168 MMHG | OXYGEN SATURATION: 99 % | HEART RATE: 83 BPM | WEIGHT: 157 LBS | TEMPERATURE: 98.6 F | RESPIRATION RATE: 18 BRPM | DIASTOLIC BLOOD PRESSURE: 99 MMHG | BODY MASS INDEX: 25.73 KG/M2

## 2021-12-04 DIAGNOSIS — F41.9 ANXIETY: ICD-10-CM

## 2021-12-04 DIAGNOSIS — R07.9 CHEST PAIN, UNSPECIFIED TYPE: ICD-10-CM

## 2021-12-04 LAB
ALBUMIN SERPL-MCNC: 3.6 G/DL (ref 3.4–5)
ALP SERPL-CCNC: 95 U/L (ref 40–150)
ALT SERPL W P-5'-P-CCNC: 32 U/L (ref 0–50)
ANION GAP SERPL CALCULATED.3IONS-SCNC: 7 MMOL/L (ref 3–14)
AST SERPL W P-5'-P-CCNC: 21 U/L (ref 0–45)
BASOPHILS # BLD AUTO: 0.1 10E3/UL (ref 0–0.2)
BASOPHILS NFR BLD AUTO: 1 %
BILIRUB SERPL-MCNC: 0.5 MG/DL (ref 0.2–1.3)
BUN SERPL-MCNC: 7 MG/DL (ref 7–30)
CALCIUM SERPL-MCNC: 9.6 MG/DL (ref 8.5–10.1)
CHLORIDE BLD-SCNC: 107 MMOL/L (ref 94–109)
CO2 SERPL-SCNC: 24 MMOL/L (ref 20–32)
CREAT SERPL-MCNC: 0.54 MG/DL (ref 0.52–1.04)
EOSINOPHIL # BLD AUTO: 0.2 10E3/UL (ref 0–0.7)
EOSINOPHIL NFR BLD AUTO: 3 %
ERYTHROCYTE [DISTWIDTH] IN BLOOD BY AUTOMATED COUNT: 11.6 % (ref 10–15)
GFR SERPL CREATININE-BSD FRML MDRD: >90 ML/MIN/1.73M2
GLUCOSE BLD-MCNC: 88 MG/DL (ref 70–99)
HCT VFR BLD AUTO: 40.6 % (ref 35–47)
HGB BLD-MCNC: 13.9 G/DL (ref 11.7–15.7)
HOLD SPECIMEN: NORMAL
HOLD SPECIMEN: NORMAL
IMM GRANULOCYTES # BLD: 0 10E3/UL
IMM GRANULOCYTES NFR BLD: 0 %
LIPASE SERPL-CCNC: 68 U/L (ref 73–393)
LYMPHOCYTES # BLD AUTO: 2.8 10E3/UL (ref 0.8–5.3)
LYMPHOCYTES NFR BLD AUTO: 29 %
MCH RBC QN AUTO: 31.4 PG (ref 26.5–33)
MCHC RBC AUTO-ENTMCNC: 34.2 G/DL (ref 31.5–36.5)
MCV RBC AUTO: 92 FL (ref 78–100)
MONOCYTES # BLD AUTO: 0.5 10E3/UL (ref 0–1.3)
MONOCYTES NFR BLD AUTO: 5 %
NEUTROPHILS # BLD AUTO: 5.9 10E3/UL (ref 1.6–8.3)
NEUTROPHILS NFR BLD AUTO: 62 %
NRBC # BLD AUTO: 0 10E3/UL
NRBC BLD AUTO-RTO: 0 /100
PLATELET # BLD AUTO: 312 10E3/UL (ref 150–450)
POTASSIUM BLD-SCNC: 3.8 MMOL/L (ref 3.4–5.3)
PROT SERPL-MCNC: 7.6 G/DL (ref 6.8–8.8)
RBC # BLD AUTO: 4.42 10E6/UL (ref 3.8–5.2)
SARS-COV-2 RNA RESP QL NAA+PROBE: NEGATIVE
SODIUM SERPL-SCNC: 138 MMOL/L (ref 133–144)
TROPONIN I SERPL HS-MCNC: 6 NG/L
WBC # BLD AUTO: 9.5 10E3/UL (ref 4–11)

## 2021-12-04 PROCEDURE — 87635 SARS-COV-2 COVID-19 AMP PRB: CPT | Performed by: EMERGENCY MEDICINE

## 2021-12-04 PROCEDURE — 84484 ASSAY OF TROPONIN QUANT: CPT | Performed by: EMERGENCY MEDICINE

## 2021-12-04 PROCEDURE — 36415 COLL VENOUS BLD VENIPUNCTURE: CPT | Performed by: EMERGENCY MEDICINE

## 2021-12-04 PROCEDURE — 99285 EMERGENCY DEPT VISIT HI MDM: CPT | Mod: 25

## 2021-12-04 PROCEDURE — 85025 COMPLETE CBC W/AUTO DIFF WBC: CPT | Performed by: EMERGENCY MEDICINE

## 2021-12-04 PROCEDURE — 71046 X-RAY EXAM CHEST 2 VIEWS: CPT

## 2021-12-04 PROCEDURE — 90791 PSYCH DIAGNOSTIC EVALUATION: CPT

## 2021-12-04 PROCEDURE — 80053 COMPREHEN METABOLIC PANEL: CPT | Performed by: EMERGENCY MEDICINE

## 2021-12-04 PROCEDURE — C9803 HOPD COVID-19 SPEC COLLECT: HCPCS

## 2021-12-04 PROCEDURE — 83690 ASSAY OF LIPASE: CPT | Performed by: EMERGENCY MEDICINE

## 2021-12-04 PROCEDURE — 250N000013 HC RX MED GY IP 250 OP 250 PS 637: Performed by: EMERGENCY MEDICINE

## 2021-12-04 RX ORDER — GABAPENTIN 100 MG/1
200 CAPSULE ORAL ONCE
Status: COMPLETED | OUTPATIENT
Start: 2021-12-04 | End: 2021-12-04

## 2021-12-04 RX ADMIN — GABAPENTIN 200 MG: 100 CAPSULE ORAL at 15:19

## 2021-12-04 NOTE — CONSULTS
12/4/2021  Josephine Georges 1970     [facility] Crisis Assessment    Patient was assessed: in person  Patient location: ED    Referral Data and Chief Complaint  Josephine is a 51 year old who uses she/her pronouns. Patient presented to the ED with family/friends and was referred to the ED by family/friends. Patient is presenting to the ED for the following concerns: anxiety and chest pain.      Informed Consent and Assessment Methods    Patient is her own guardian. Writer met with patient and explained the crisis assessment process, including applicable information disclosures and limits to confidentiality, assessed understanding of the process, and obtained consent to proceed with the assessment. Patient was observed to be able to participate in the assessment as evidenced by understanding of the process. Assessment methods included conducting a formal interview with patient, review of medical records, collaboration with medical staff, and obtaining relevant collateral information from family and community providers when available.    Narrative Summary of Presenting Problem and Current Functioning  What led to the patient presenting for crisis services, factors that make the crisis life threatening or complex, stressors, how is this disrupting the patient's life, and how current functioning is in comparison to baseline. How is patient presenting during the assessment.     Pt is a 51 year old female who presents to the ED for the second time this week in concerns to her anxiety.  PT is from out of the [facility] and hasn't been able to return home due to the level of her anxiety.  PT reports that she returned to the ED today after fear of something being wrong with her heart.    Pt is hyper focused on somatic complaints such as her blood pressure and the what ifs of medical concerns (ie what if I have high cholesterol, what if I have a heart condition).  Writer spent a significant time psycho educating her on the anxiety,  rumination, and somatic thinking.    Pt additionally is ruminative on receiving Ativan and or medication that will lower her blood pressure.  Writer informed her that the psychiatric care team and medical providers have already informed her that she will not be receiving an further albertina's due to addition concerns and future withdrawal concerns.    Pt denies any safety concerns including suicidal ideation, intent, or planning.  PT denies any self harm or homicidal ideation.  PT presents as future and goal oriented.      Pt denies any previous  hospitalizations.    History of the Crisis  Duration of the current crisis, coping skills attempted to reduce the crisis, community resources used, and past presentations.    From her assessment on  from [facility] :    Patient reports long standing hx of anxiety.  She had been on Klonopin 0.5 mg TID for 9 years and her doctor discontinued due to some alcohol use, reports last dose of Klonopin she took was September 15.  She went to see a doctor other than her PCP to get several  Benzodiazepine RX from several doctors within the last month to help for her anxiety. Her doctor has her on Cloinidine and Hydroxizine for which she feels are not helpful in managing her anxiety. One doctor gave her a Rx for Prozac, but she has been researching this medication and doesn't feel comfortable taking it.  She has been on other medication trails including: Zoloft for 16 years, stopped in .  Lexapro in 2021, took for 4 days and reports having reaction.  She feel Ativan is the only medication to help with her symptoms.    Patient reports feeling more depressed lately.  Thanksgiving weekend is the 10 year anniversary of her daughter who  from a genetic disorder.  Patient admitted she started drinking heavily shortly there after.  She entered treatment at [facility] and also then started having a gambling issue.  She states currently does not drink alcohol and  denies problems with gambling.     Collateral Information    Zeeshan [phone]  Zeeshan presented with the Pt.  He reports concern that its not getting better for her and they want to return back to home.  He wasn't aware that she had refused/declined services such as mental health services or detox while on [facility].  He denied any safety concerns.     Risk Assessment    Risk of Harm to Self     ESS-6  1.a. Over the past 2 weeks, have you had thoughts of killing yourself? No  1.b. Have you ever attempted to kill yourself and, if yes, when did this last happen? No   2. Recent or current suicide plan? No   3. Recent or current intent to act on ideation? No  4. Lifetime psychiatric hospitalization? No  5. Pattern of excessive substance use? Yes  6. Current irritability, agitation, or aggression? No  Scoring note: BOTH 1a and 1b must be yes for it to score 1 point, if both are not yes it is zero. All others are 1 point per number. If all questions 1a/1b - 6 are no, risk is negligible. If one of 1a/1b is yes, then risk is mild. If either question 2 or 3, but not both, is yes, then risk is automatically moderate regardless of total score. If both 2 and 3 are yes, risk is automatically high regardless of total score.     Score: 1, mild risk    The patient has the following risk factors for suicide: depressive symptoms and health stressors    Is the patient experiencing current suicidal ideation: No    Is the patient engaging in preparatory suicide behaviors (formulating how to act on plan, giving away possessions, saying goodbye, displaying dramatic behavior changes, etc)? No    Does the patient have access to firearms or other lethal means? no    The patient has the following protective factors: social support, voluntarily seeking mental health support, future focused thinking, expresses desire to engage in treatment and safe/stable housing    Support system information: partner, children    Patient strengths: support from  family and friends, help seeking    Does the patient engage in non-suicidal self-injurious behavior (NSSI/SIB)? no    Is the patient vulnerable to sexual exploitation?  No    Is the patient experiencing abuse or neglect? no    Is the patient a vulnerable adult? No      Risk of Harm to Others  The patient has the following risk factors of harm to others: no risk factors identified    Does the patient have thoughts of harming others? No    Is the patient engaging in sexually inappropriate behavior?  no       Current Substance Abuse    Is there recent substance abuse? no    Was a urine drug screen or blood alcohol level obtained: No    CAGE AID  Have you felt you ought to cut down on your drinking or drug use?  No  Have people annoyed you by criticizing your drinking or drug use? Yes  Have you felt bad or guilty about your drinking or drug use? Yes  Have you ever had a drink or used drugs first thing in the morning to steady your nerves or to get rid of a hangover? No  Score: 2/4       Current Symptoms/Concerns    Symptoms  Attention, hyperactivity, and impulsivity symptoms present: No    Anxiety symptoms present: Yes: Panic attacks and Generalized Symptoms: Agitation, Cognitive anxiety - feelings of doom, racing thoughts, difficulty concentrating , Excessive worry, Pacing, Physiological anxiety - sweating, flushing, shaking, shortness of breath, or racing heart and Somatic symptoms - abdominal pain, headache, or tension      Appetite symptoms present: No     Behavioral difficulties present: No     Cognitive impairment symptoms present: No    Depressive symptoms present: Yes Appetite change/weight change , Crying or feels like crying, Excessive guilt , Feelings of helplessness  and Impaired decision making      Eating disorder symptoms present: No    Learning disabilities, cognitive challenges, and/or developmental disorder symptoms present: No     Manic/hypomanic symptoms present: No    Personality and interpersonal  functioning difficulties present : Yes: Emotional Deregulation, Impaired Impulse Control and Impaired Interpersonal Functioning    Psychosis symptoms present: No      Sleep difficulties present: Yes: Difficulty falling asleep  and Difficulty staying sleep     Substance abuse disorder symptoms present: No     Trauma and stressor related symptoms present: No           Mental Status Exam   Affect: Appropriate   Appearance: Appropriate    Attention Span/Concentration: Attentive?    Eye Contact: Engaged   Fund of Knowledge: Appropriate    Language /Speech Content: Fluent   Language /Speech Volume: Normal    Language /Speech Rate/Productions: Normal    Recent Memory: Intact   Remote Memory: Intact   Mood: Anxious and Depressed    Orientation to Person: Yes    Orientation to Place: Yes   Orientation to Time of Day: Yes    Orientation to Date: Yes    Situation (Do they understand why they are here?): Yes    Psychomotor Behavior: Normal    Thought Content: Clear   Thought Form: Obsessive/Perseverative         Mental Health and Substance Abuse History     History  Current and historical diagnoses or mental health concerns: Depression and Anxiety     Prior MH services (inpatient, programmatic care, outpatient, etc) : No inpatient or IOP treatment.  Has never been in therapy in the past.      History of substance abuse: Yes As noted earlier     Prior LEVI services (inpatient, programmatic care, detox, outpatient, etc) : Yes Has been [facility] 10 years ago     History of commitment: No     Family history of MH/LEVI: Yes 2 borthers with opiate use disorder      Trauma history: No     Medication  Psychotropic medications: Yes. Pt is currently taking As noted. Medication compliant: No: As noted above. . Recent medication changes: No     Current Care Team  Primary Care Provider: Yes. Name: Dr Disla. Location: [place] [address]. Date of last visit: 2 weeks. Frequency: unknown. Perceived helpfulness: Has  a good relationship with this provider. .     Psychiatrist: No     Therapist: No     : No     CTSS or ARMHS: No     ACT Team: No     Other: No     Release of Information  Was a release of information signed: No.         Biopsychosocial Information     Socioeconomic Information  Current living situation: Lives with youngest daughter.     Employment/income source: Patient has not worked for past 3 years.      Relevant legal issues: Denies     Cultural, Episcopalian, or spiritual influences on mental health care: None     Is the patient active in the  or a : No        Relevant Medical Concerns   Patient identifies concerns with completing ADLs? Yes      Patient can ambulate independently? Yes      Other medical concerns? Yes -Hypertension     History of concussion or TBI? No          Diagnosis    311 (F32.9) Unspecified Depressive Disorder     300.02 (F41.1) Generalized Anxiety Disorder    Substance-Related & Addictive Disorders 292.0 Sedative, Hypnotic or Anxiolytic Withdrawal  none  (F13.239) Sedative, Hypnotic or Anxiolytic Withdrawal Without perceptual disturbances - provisional       Therapeutic Intervention  The following therapeutic methodologies were employed when working with the patient: establishing rapport, active listening and identifying additional supports and alternative coping skills. Patient response to intervention: Patient is engaged and open to discharge recommendations        Disposition  Recommended disposition: outpatient, PHP     Reviewed case and recommendations with attending provider. Attending Name: Phil     Attending concurs with disposition: Yes       Patient concurs with disposition: Yes       Guardian concurs with disposition: NA      Final disposition: outpatient PHP       Clinical Substantiation of Recommendations   Rationale with supporting factors for disposition and diagnosis.     Writer at the time of assessment recommends discharge.  Pt denies any  acute safety concerns including suicidal ideation, intent, or planning.  PT denies any self harm or homicidal ideation.  Pt is not in need of acute stabilization for psychosis, rowan, delusional thinking, or paranoia.  Pt is appropriate to discharge into outpatient services.  PT was agreeable to PHP intake as well as the follow up with her PCP    Assessment Details  Patient interview started at: 1500 and completed at: 1600.    Total duration spent on the patient case in minutes: 1.0 hrs     CPT code(s) utilized: 78352 - Psychotherapy for Crisis - 60 (30-74*) min       Aftercare and Safety Planning  Follow up plans with MH/LEVI services: Yes Pt was scheduled for a PHP intake on 12/17      Aftercare plan placed in the AVS and provided to patient: Yes. Given to patient by MARV [facility]      If I am feeling unsafe or I am in a crisis, I will:   Contact my established care providers   Call the National Suicide Prevention Lifeline: 814.693.2761   Go to the nearest emergency room   Call 911      Warning signs that I or other people might notice when a crisis is developing for me: 1. Feeling panic  2. Blood Pressure is High  3. Changes Happening In My Body     Things I am able to do on my own to cope or help me feel better:   1. Breathing  2. Watching My Eating To Address My Blood Pressure      Things that I am able to do with others to cope or help me better:   1. Talk To My Friend Zeeshan for support and distraction  2. Spend time with Youngest Daughter and calling other adult children.       Things I can use or do for distraction:   1.Breathing      Changes I can make to support my mental health and wellness:   1. Continue to Watching Diet  2. Be Referred to Medication Management and Therapy appointments to follow up with mental heatlh.       People in my life that I can ask for help:   1. My adult children  2. My friend Zeeshan.       Your [facility] has a mental health crisis team you can call 24/7: [facility]  "Crisis: [phone]     Crisis Lines  Crisis Text Line  Text 179104  You will be connected with a trained live crisis counselor to provide support.    National Hope Line  1.800.SUICIDE [4309721]      Community Resources  Fast Tracker  Linking people to mental health and substance use disorder resources  WebLinc.nodishes.co.uk     [place] Mental Health Warm Line  Peer to peer support  Monday thru Saturday, 12 pm to 10 pm  279.937.7963 or 6.224.622.0070  Text \"Support\" to 09257    National Lead on Mental Illness (VERNON)  174.878.3841 or 1.888.VERNON.HELPS        Mental Health Apps  My3  https://mySiVerionpp.org/    VirtualHopeBox  https://mindSHIFT Technologies.org/apps/virtual-hope-box/            "

## 2021-12-04 NOTE — ED PROVIDER NOTES
"History   Chief Complaint:  \"I feel like I'm dying\"    HPI   History supplemented by electronic chart review    Josephine Georges is a 51 year old female who presents with concerns regarding anxiety, and a sensation that she is dying, along with ongoing chest pain and concern for high blood pressure.  She was seen here for the same symptoms 3 days ago in the emergency department, at which time she was medically cleared and ultimately transferred to [facility], where she was initiated on gabapentin and Lexapro and was observed overnight, subsequently meeting criteria for discharge on December 2.  She went to a local hotel where she has been staying, stating that she feels too anxious to even drive her vehicle home.  She reports a history of heavy alcohol use though has not had any in about a month.  She was on clonazepam through the summer, then more recently was given several prescriptions for lorazepam in November out of concern for anxiety and high blood pressure while awaiting follow-up appointment with your clinic team on December 6.  She does not feel that Vistaril helps with her anxiety much.  She was advised to take it 3 times daily upon recent discharge from [facility].  She denies other drug use.  She denies any fever or cough.  She states she no longer gets menstrual periods and denies any possibility of pregnancy.  She specifically requests a dose of Ativan.    Review of Systems  All other systems reviewed and negative except as above in HPI.    Allergies:  Penicillin V  Tetracycline     Medications:    cloNIDine (CATAPRES) 0.1 MG tablet  cloNIDine (CATAPRES) 0.2 MG tablet  escitalopram (LEXAPRO) 5 MG tablet  gabapentin (NEURONTIN) 100 MG capsule  hydrOXYzine (ATARAX) 25 MG tablet  nicotine (NICODERM CQ) 21 MG/24HR 24 hr patch        Past Medical History:    No past medical history on file.    Patient Active Problem List    Diagnosis Date Noted     Panic disorder without agoraphobia 12/01/2021     Priority: Medium "     Benign essential hypertension 12/01/2021     Priority: Medium     NANCY (generalized anxiety disorder) 12/01/2021     Priority: Medium     Sedative, hypnotic or anxiolytic dependence (H) 12/01/2021     Priority: Medium     Depression, unspecified depression type 12/01/2021     Priority: Medium        Past Surgical History:    No past surgical history on file.     Family History:    family history is not on file.    Social History:  Staying in hotel lately.    Physical Exam     Patient Vitals for the past 24 hrs:   BP Temp Temp src Pulse Resp SpO2 Weight   12/04/21 1212 (!) 168/99 98.6  F (37  C) Temporal 83 18 99 % 71.2 kg (157 lb)        Physical Exam  General: Nontoxic appearing woman sitting upright in  1  HENT: mucous membranes moist  CV: rate as above, regular rhythm, no lower extremity edema, no JVD, palpable symmetric radial pulses, no murmur audible  Resp: normal effort, speaks in full phrases, no stridor, no cough observed  GI: abdomen soft and nontender, no guarding, negative Brewer's sign  MSK: no bony tenderness to chest  Skin: appropriately warm and dry, no erythema or vesicles to chest wall  Neuro: alert, clear speech, oriented, moves all extremities with good tone  Psych: cooperative, significantly anxious, intermittently slightly tearful, no evidence of hallucinations    Emergency Department Course   Electrocardiogram  ECG taken at 1449, ECG interpreted at 1453 by JESSICA Villarreal MD  Sinus rhythm  Rate 61 bpm. NC interval 182. QRS duration 90. QTc 428      Imaging:    XR Chest 2 Views   Final Result   IMPRESSION: Negative chest.                Laboratory:  Labs Ordered and Resulted from Time of ED Arrival to Time of ED Departure   LIPASE - Abnormal       Result Value    Lipase 68 (*)    COVID-19 VIRUS (CORONAVIRUS) BY PCR - Normal    SARS CoV2 PCR Negative     COMPREHENSIVE METABOLIC PANEL - Normal    Sodium 138      Potassium 3.8      Chloride 107      Carbon Dioxide (CO2) 24      Anion Gap 7       Urea Nitrogen 7      Creatinine 0.54      Calcium 9.6      Glucose 88      Alkaline Phosphatase 95      AST 21      ALT 32      Protein Total 7.6      Albumin 3.6      Bilirubin Total 0.5      GFR Estimate >90     TROPONIN I - Normal    Troponin I High Sensitivity 6     CBC WITH PLATELETS AND DIFFERENTIAL    WBC Count 9.5      RBC Count 4.42      Hemoglobin 13.9      Hematocrit 40.6      MCV 92      MCH 31.4      MCHC 34.2      RDW 11.6      Platelet Count 312      % Neutrophils 62      % Lymphocytes 29      % Monocytes 5      % Eosinophils 3      % Basophils 1      % Immature Granulocytes 0      NRBCs per 100 WBC 0      Absolute Neutrophils 5.9      Absolute Lymphocytes 2.8      Absolute Monocytes 0.5      Absolute Eosinophils 0.2      Absolute Basophils 0.1      Absolute Immature Granulocytes 0.0      Absolute NRBCs 0.0        Emergency Department Course:  Reviewed:  I reviewed nursing notes, vitals, and past medical history  I looked up this patient's record in the [facility].    Assessments:  I obtained history and examined the patient as noted above.     Consults:   DEC    Interventions:  Medications   gabapentin (NEURONTIN) capsule 200 mg (200 mg Oral Given 12/4/21 1519)        Disposition:  discharged    Impression & Plan      Covid-19  Alyson Adams was evaluated during a global COVID-19 pandemic, which necessitated consideration that the patient might be at risk for infection with the SARS-CoV-2 virus that causes COVID-19.   Applicable protocols for evaluation were followed during the patient's care.   COVID-19 was considered as part of the patient's evaluation.  The plan for testing is:  a test was obtained during this visit.    Medical Decision Making:  Her presentation is very similar to when she arrived here 3 days ago and was ultimately sent to the [facility] where she spent the night in observation before being discharged with adjustments in her medications.  Regarding her chest pain, I considered a  variety of serious causes such as acute coronary syndrome, aortic pathology, pulmonary embolism, pneumothorax, and others, though her evaluation is benign in this regard and I do not think she requires further medical work-up at this time.  Patient was quite focused on getting back on the rise of him, though I did discuss this specifically with the consulting mental health service and we both felt that this is not in her best interest so her request was declined, noting that  records show multiple prescriptions for lorazepam in November after a number of months gap after her most recent clonazepam use.  Benzodiazepine withdrawal is not identified or suspected at this time.  The importance of pursuing outpatient follow-up with mental health resources provided was reviewed with her.  She was evaluated directly by the mental health team here in the emergency department today who subsequently recommended discharge with close follow-up.  She is not suicidal and does not meet criteria to be held against her will.    Diagnosis:    ICD-10-CM    1. Anxiety  F41.9    2. Chest pain, unspecified type  R07.9            12/4/2021   JESSICA Villarreal MD    This note was completed in part using Dragon voice recognition software. Although reviewed after completion, some word and grammatical errors may occur.           Dimitry Villarreal MD  12/04/21 7100

## 2021-12-04 NOTE — DISCHARGE INSTRUCTIONS
You have been scheduled a partial hospitalization intake appt for 12/17 at 1:00 PM for your intake.  Make sure you have your [facility] MyCHART activated .  This will be via your phone or laptop    Follow up with your PCP on 12/6 as already scheduled      If I am feeling unsafe or I am in a crisis, I will:   Contact my established care providers   Call the National Suicide Prevention Lifeline: [phone]   Go to the nearest emergency room   Call 911      Warning signs that I or other people might notice when a crisis is developing for me: 1. Feeling panic  2. Blood Pressure is High  3. Changes Happening In My Body     Things I am able to do on my own to cope or help me feel better:   1. Breathing  2. Watching My Eating To Address My Blood Pressure      Things that I am able to do with others to cope or help me better:   1. Talk To My Friend Zeeshan for support and distraction  2. Spend time with Youngest Daughter and calling other adult children.       Things I can use or do for distraction:   1.Breathing      Changes I can make to support my mental health and wellness:   1. Continue to Watching Diet  2. Be Referred to Medication Management and Therapy appointments to follow up with mental heatlh.       People in my life that I can ask for help:   1. My adult children  2. My friend Zeeshan.       Your [facility] has a mental health crisis team you can call 24/7: [facility]: [phone]        Discharge Instructions  Mental Health Concerns    You were seen today for mental health concerns, such as depression, anxiety, or suicidal thinking. Your provider feels that you do not require hospitalization at this time. However, your symptoms may become worse, and you may need to return to the Emergency Department. Most treatments of depression and suicidal thoughts are a process rather than a single intervention.  Medications and counseling can take several weeks or more to help.    Generally, every Emergency Department  visit should have a follow-up clinic visit with either a primary or a specialty clinic/provider. Please follow-up as instructed by your emergency provider today.    By accepting these discharge instructions:  You promise to not harm yourself or others.  You agree that if you feel you are becoming unable to keep that promise, you will do something to help yourself before you do anything to harm yourself or others.   You agree to keep any safety plan arranged on your visit here today.  You agree to take any medication prescribed or recommended by your provider.  If you are getting worse, you can contact a friend or a family member, contact your counselor or family provider, contact a crisis line, or other options discussed with the provider or therapist today.  At any time, you can call 911 and return to the Emergency Department for more help.  You understand that follow-up is essential to your treatment, and you will make and keep appointments recommended on your visit today.    How to improve your mental health and prevent suicide:  Involve others by letting family, friends, counselors know.  Do not isolate yourself.  Avoid alcohol or drugs. Remove weapons, poisons from your home.  Try to stick to routines for eating, sleeping and getting regular exercise.    Try to get into sunlight. Bright natural light not only treats seasonal affective disorder but also depression.  Increase safe activities that you enjoy.    If you feel worse, contact [phone] ([phone]), or call 911, or your primary provider/counselor for additional assistance.    If you were given a prescription for medicine here today, be sure to read all of the information (including the package insert) that comes with your prescription.  This will include important information about the medicine, its side effects, and any warnings that you need to know about.  The pharmacist who fills the prescription can provide more information and answer  questions you may have about the medicine.  If you have questions or concerns that the pharmacist cannot address, please call or return to the Emergency Department.   Remember that you can always come back to the Emergency Department if you are not able to see your regular provider in the amount of time listed above, if you get any new symptoms, or if there is anything that worries you.

## 2021-12-05 LAB
ATRIAL RATE - MUSE: 61 BPM
DIASTOLIC BLOOD PRESSURE - MUSE: NORMAL MMHG
INTERPRETATION ECG - MUSE: NORMAL
P AXIS - MUSE: 53 DEGREES
PR INTERVAL - MUSE: 182 MS
QRS DURATION - MUSE: 90 MS
QT - MUSE: 426 MS
QTC - MUSE: 428 MS
R AXIS - MUSE: 49 DEGREES
SYSTOLIC BLOOD PRESSURE - MUSE: NORMAL MMHG
T AXIS - MUSE: 49 DEGREES
VENTRICULAR RATE- MUSE: 61 BPM

## 2021-12-15 ENCOUNTER — TELEPHONE (OUTPATIENT)
Dept: BEHAVIORAL HEALTH | Facility: CLINIC | Age: 51
End: 2021-12-15
Payer: COMMERCIAL